# Patient Record
Sex: MALE | Race: WHITE | NOT HISPANIC OR LATINO
[De-identification: names, ages, dates, MRNs, and addresses within clinical notes are randomized per-mention and may not be internally consistent; named-entity substitution may affect disease eponyms.]

---

## 2022-04-11 ENCOUNTER — APPOINTMENT (OUTPATIENT)
Dept: ORTHOPEDIC SURGERY | Facility: CLINIC | Age: 32
End: 2022-04-11
Payer: OTHER MISCELLANEOUS

## 2022-04-11 VITALS — BODY MASS INDEX: 26.41 KG/M2 | HEIGHT: 72 IN | WEIGHT: 195 LBS

## 2022-04-11 DIAGNOSIS — Z78.9 OTHER SPECIFIED HEALTH STATUS: ICD-10-CM

## 2022-04-11 DIAGNOSIS — M76.899 OTHER SPECIFIED ENTHESOPATHIES OF UNSPECIFIED LOWER LIMB, EXCLUDING FOOT: ICD-10-CM

## 2022-04-11 PROBLEM — Z00.00 ENCOUNTER FOR PREVENTIVE HEALTH EXAMINATION: Status: ACTIVE | Noted: 2022-04-11

## 2022-04-11 PROCEDURE — 99072 ADDL SUPL MATRL&STAF TM PHE: CPT

## 2022-04-11 PROCEDURE — 99455 WORK RELATED DISABILITY EXAM: CPT

## 2022-04-11 NOTE — WORK
[Has the patient reached Maximum Medical Improvement? If yes, indicate date___] : Yes, on [unfilled] [Is there permanent partial impairment?] : Yes [Right] : right [FreeTextEntry5] : 7.5% [de-identified] : motion loss [de-identified] : right knee

## 2022-04-11 NOTE — PHYSICAL EXAM
[Right] : right knee [Left] : left knee [5___] : hamstring 5[unfilled]/5 [] : patient ambulates without assistive device [[id number]] : flexion 125 degrees [de-identified] : extension 5 degrees

## 2022-06-09 ENCOUNTER — EMERGENCY (EMERGENCY)
Facility: HOSPITAL | Age: 32
LOS: 1 days | Discharge: HOME | End: 2022-06-09
Attending: EMERGENCY MEDICINE | Admitting: EMERGENCY MEDICINE
Payer: COMMERCIAL

## 2022-06-09 VITALS
SYSTOLIC BLOOD PRESSURE: 163 MMHG | RESPIRATION RATE: 14 BRPM | DIASTOLIC BLOOD PRESSURE: 84 MMHG | HEART RATE: 77 BPM | TEMPERATURE: 98 F | OXYGEN SATURATION: 96 %

## 2022-06-09 VITALS
HEART RATE: 76 BPM | OXYGEN SATURATION: 96 % | DIASTOLIC BLOOD PRESSURE: 87 MMHG | HEIGHT: 72 IN | SYSTOLIC BLOOD PRESSURE: 132 MMHG | RESPIRATION RATE: 14 BRPM | TEMPERATURE: 98 F | WEIGHT: 179.9 LBS

## 2022-06-09 LAB
ALBUMIN SERPL ELPH-MCNC: 4.5 G/DL — SIGNIFICANT CHANGE UP (ref 3.3–5)
ALP SERPL-CCNC: 43 U/L — SIGNIFICANT CHANGE UP (ref 30–120)
ALT FLD-CCNC: 23 U/L DA — SIGNIFICANT CHANGE UP (ref 10–60)
ANION GAP SERPL CALC-SCNC: 9 MMOL/L — SIGNIFICANT CHANGE UP (ref 5–17)
ANISOCYTOSIS BLD QL: SLIGHT — SIGNIFICANT CHANGE UP
AST SERPL-CCNC: 14 U/L — SIGNIFICANT CHANGE UP (ref 10–40)
BASOPHILS # BLD AUTO: 0 K/UL — SIGNIFICANT CHANGE UP (ref 0–0.2)
BASOPHILS NFR BLD AUTO: 0 % — SIGNIFICANT CHANGE UP (ref 0–2)
BILIRUB SERPL-MCNC: 0.8 MG/DL — SIGNIFICANT CHANGE UP (ref 0.2–1.2)
BUN SERPL-MCNC: 19 MG/DL — SIGNIFICANT CHANGE UP (ref 7–23)
CALCIUM SERPL-MCNC: 9.7 MG/DL — SIGNIFICANT CHANGE UP (ref 8.4–10.5)
CHLORIDE SERPL-SCNC: 100 MMOL/L — SIGNIFICANT CHANGE UP (ref 96–108)
CO2 SERPL-SCNC: 28 MMOL/L — SIGNIFICANT CHANGE UP (ref 22–31)
CREAT SERPL-MCNC: 1.06 MG/DL — SIGNIFICANT CHANGE UP (ref 0.5–1.3)
EGFR: 96 ML/MIN/1.73M2 — SIGNIFICANT CHANGE UP
EOSINOPHIL # BLD AUTO: 0 K/UL — SIGNIFICANT CHANGE UP (ref 0–0.5)
EOSINOPHIL NFR BLD AUTO: 0 % — SIGNIFICANT CHANGE UP (ref 0–6)
GLUCOSE SERPL-MCNC: 103 MG/DL — HIGH (ref 70–99)
HCT VFR BLD CALC: 36.6 % — LOW (ref 39–50)
HGB BLD-MCNC: 11.7 G/DL — LOW (ref 13–17)
HYPOCHROMIA BLD QL: SIGNIFICANT CHANGE UP
LIDOCAIN IGE QN: 98 U/L — SIGNIFICANT CHANGE UP (ref 73–393)
LYMPHOCYTES # BLD AUTO: 0.82 K/UL — LOW (ref 1–3.3)
LYMPHOCYTES # BLD AUTO: 16 % — SIGNIFICANT CHANGE UP (ref 13–44)
MANUAL SMEAR VERIFICATION: SIGNIFICANT CHANGE UP
MCHC RBC-ENTMCNC: 20.6 PG — LOW (ref 27–34)
MCHC RBC-ENTMCNC: 32 GM/DL — SIGNIFICANT CHANGE UP (ref 32–36)
MCV RBC AUTO: 64.6 FL — LOW (ref 80–100)
MICROCYTES BLD QL: SIGNIFICANT CHANGE UP
MONOCYTES # BLD AUTO: 0.1 K/UL — SIGNIFICANT CHANGE UP (ref 0–0.9)
MONOCYTES NFR BLD AUTO: 2 % — SIGNIFICANT CHANGE UP (ref 2–14)
NEUTROPHILS # BLD AUTO: 4.2 K/UL — SIGNIFICANT CHANGE UP (ref 1.8–7.4)
NEUTROPHILS NFR BLD AUTO: 82 % — HIGH (ref 43–77)
NRBC # BLD: 0 — SIGNIFICANT CHANGE UP
NRBC # BLD: SIGNIFICANT CHANGE UP /100 WBCS (ref 0–0)
PLAT MORPH BLD: NORMAL — SIGNIFICANT CHANGE UP
PLATELET # BLD AUTO: 148 K/UL — LOW (ref 150–400)
PLATELET COUNT - ESTIMATE: NORMAL — SIGNIFICANT CHANGE UP
POIKILOCYTOSIS BLD QL AUTO: SLIGHT — SIGNIFICANT CHANGE UP
POLYCHROMASIA BLD QL SMEAR: SLIGHT — SIGNIFICANT CHANGE UP
POTASSIUM SERPL-MCNC: 3.9 MMOL/L — SIGNIFICANT CHANGE UP (ref 3.5–5.3)
POTASSIUM SERPL-SCNC: 3.9 MMOL/L — SIGNIFICANT CHANGE UP (ref 3.5–5.3)
PROT SERPL-MCNC: 6.8 G/DL — SIGNIFICANT CHANGE UP (ref 6–8.3)
RBC # BLD: 5.67 M/UL — SIGNIFICANT CHANGE UP (ref 4.2–5.8)
RBC # FLD: 17 % — HIGH (ref 10.3–14.5)
RBC BLD AUTO: ABNORMAL
SARS-COV-2 RNA SPEC QL NAA+PROBE: SIGNIFICANT CHANGE UP
SCHISTOCYTES BLD QL AUTO: SLIGHT — SIGNIFICANT CHANGE UP
SODIUM SERPL-SCNC: 137 MMOL/L — SIGNIFICANT CHANGE UP (ref 135–145)
WBC # BLD: 5.12 K/UL — SIGNIFICANT CHANGE UP (ref 3.8–10.5)
WBC # FLD AUTO: 5.12 K/UL — SIGNIFICANT CHANGE UP (ref 3.8–10.5)

## 2022-06-09 PROCEDURE — 87635 SARS-COV-2 COVID-19 AMP PRB: CPT

## 2022-06-09 PROCEDURE — 70450 CT HEAD/BRAIN W/O DYE: CPT | Mod: MA

## 2022-06-09 PROCEDURE — 80053 COMPREHEN METABOLIC PANEL: CPT

## 2022-06-09 PROCEDURE — 96360 HYDRATION IV INFUSION INIT: CPT

## 2022-06-09 PROCEDURE — 70450 CT HEAD/BRAIN W/O DYE: CPT | Mod: 26,MA

## 2022-06-09 PROCEDURE — 36415 COLL VENOUS BLD VENIPUNCTURE: CPT

## 2022-06-09 PROCEDURE — 85025 COMPLETE CBC W/AUTO DIFF WBC: CPT

## 2022-06-09 PROCEDURE — 99284 EMERGENCY DEPT VISIT MOD MDM: CPT | Mod: 25

## 2022-06-09 PROCEDURE — 83690 ASSAY OF LIPASE: CPT

## 2022-06-09 PROCEDURE — 99285 EMERGENCY DEPT VISIT HI MDM: CPT

## 2022-06-09 RX ORDER — SODIUM CHLORIDE 9 MG/ML
1000 INJECTION INTRAMUSCULAR; INTRAVENOUS; SUBCUTANEOUS ONCE
Refills: 0 | Status: COMPLETED | OUTPATIENT
Start: 2022-06-09 | End: 2022-06-09

## 2022-06-09 RX ADMIN — SODIUM CHLORIDE 1000 MILLILITER(S): 9 INJECTION INTRAMUSCULAR; INTRAVENOUS; SUBCUTANEOUS at 09:02

## 2022-06-09 RX ADMIN — SODIUM CHLORIDE 1000 MILLILITER(S): 9 INJECTION INTRAMUSCULAR; INTRAVENOUS; SUBCUTANEOUS at 10:00

## 2022-06-09 NOTE — ED ADULT NURSE NOTE - NS SC CAGE ALCOHOL EYE OPENER
RN and Assist. Director Nursing requesting 1:1 observation be d/c'ed as pt currently calm, cooperative. Discussed w/ Dr. Figueroa, will d/c 1:1. no

## 2022-06-09 NOTE — ED PROVIDER NOTE - EYES, MLM
Clear bilaterally, pupils equal, round and reactive to light. pink conjunctiva. no scleral icterus. no photophobia

## 2022-06-09 NOTE — ED PROVIDER NOTE - CARE PROVIDER_API CALL
Gil Jane  INTERNAL MEDICINE  [address] [place] [address] [address]  [place] [address]  Phone: [phone]  Fax: [phone]  Follow Up Time: 1-3 Days

## 2022-06-09 NOTE — ED PROVIDER NOTE - PROGRESS NOTE DETAILS
Reevaluated patient at bedside.  Patient feeling much improved, no ha, no nausea. pt declining LP.  Discussed the results of all diagnostic testing in ED and copies of all reports given.   An opportunity to ask questions was given.  Discussed the importance of prompt, close medical follow-up.  Patient will return with any changes, concerns or persistent / worsening symptoms.  Understanding of all instructions verbalized.

## 2022-06-09 NOTE — ED PROVIDER NOTE - CLINICAL SUMMARY MEDICAL DECISION MAKING FREE TEXT BOX
pt bib ems for n/v at work this am. pt relates as he got to work, he developed mild ha, nausea, then vomited. pt then began to hyperventilate, felt tingling in his extermities, ems called, pt given zofran 4mg by ems, nausea completely resolved, headache greatly improved.  plan - labs/ct/ivf

## 2022-06-09 NOTE — ED ADULT NURSE NOTE - NSIMPLEMENTINTERV_GEN_ALL_ED
Implemented All Universal Safety Interventions:  [place] to call system. Call bell, personal items and telephone within reach. Instruct patient to call for assistance. Room bathroom lighting operational. Non-slip footwear when patient is off stretcher. Physically safe environment: no spills, clutter or unnecessary equipment. Stretcher in lowest position, wheels locked, appropriate side rails in place.

## 2022-06-09 NOTE — ED PROVIDER NOTE - PATIENT PORTAL LINK FT
You can access the FollowMyHealth Patient Portal offered by [facility] by registering at the following website: [email or web address]. By joining [employer]’s FollowMyHealth portal, you will also be able to view your health information using other applications (apps) compatible with our system.

## 2022-06-09 NOTE — ED PROVIDER NOTE - OBJECTIVE STATEMENT
pt bib ems for n/v at work this am. pt relates as he got to work, he developed mild ha, nausea, then vomited. pt then began to hyperventilate, felt tingling in his extremities, ems called, pt given zofran 4mg by ems, nausea completely resolved, headache greatly improved. no fevers, chills, cp, sob, cough, abd pain, diarrhea, urinary complaints.  [facility]

## 2022-06-09 NOTE — ED ADULT NURSE NOTE - OBJECTIVE STATEMENT
today went to work and had dry heaves and developed numbness and tingling to hands feet and lips but resolved prior to arrival. still with mild nausea. c/o 35 pound weight loss in 2 years but last year has lost 10 of those pounds. denies cough or sore throat.  pt aaox3 skin warm and dry no resp distress lungs clear and equal b/l ascultation abd soft non tender + bs  pt c/o photophobia but denies fever or chills no worsening s/s  with touching chin to chest

## 2022-12-16 PROBLEM — Z78.9 OTHER SPECIFIED HEALTH STATUS: Chronic | Status: ACTIVE | Noted: 2022-06-09

## 2022-12-18 ENCOUNTER — FORM ENCOUNTER (OUTPATIENT)
Age: 32
End: 2022-12-18

## 2022-12-19 ENCOUNTER — APPOINTMENT (OUTPATIENT)
Dept: ORTHOPEDIC SURGERY | Facility: CLINIC | Age: 32
End: 2022-12-19

## 2022-12-19 ENCOUNTER — NON-APPOINTMENT (OUTPATIENT)
Age: 32
End: 2022-12-19

## 2022-12-19 ENCOUNTER — APPOINTMENT (OUTPATIENT)
Dept: MRI IMAGING | Facility: CLINIC | Age: 32
End: 2022-12-19
Payer: OTHER MISCELLANEOUS

## 2022-12-19 VITALS — HEIGHT: 72 IN | BODY MASS INDEX: 26.41 KG/M2 | WEIGHT: 195 LBS

## 2022-12-19 DIAGNOSIS — S43.422A SPRAIN OF LEFT ROTATOR CUFF CAPSULE, INITIAL ENCOUNTER: ICD-10-CM

## 2022-12-19 PROCEDURE — 99072 ADDL SUPL MATRL&STAF TM PHE: CPT

## 2022-12-19 PROCEDURE — 73010 X-RAY EXAM OF SHOULDER BLADE: CPT | Mod: LT

## 2022-12-19 PROCEDURE — 99214 OFFICE O/P EST MOD 30 MIN: CPT

## 2022-12-19 PROCEDURE — 73221 MRI JOINT UPR EXTREM W/O DYE: CPT | Mod: LT

## 2022-12-19 PROCEDURE — 73030 X-RAY EXAM OF SHOULDER: CPT | Mod: LT

## 2022-12-19 NOTE — ASSESSMENT
[FreeTextEntry1] : TRAUMATIC LEFT SHOULDER INJURY AFTER LIFTING INJURY AT WORK\par CONCERN FOR DISLOCATION/INSTABILITY EPISODE\par GUARDED ON EXAM TODAY\par NORMAL XRAYS\par ADVISED MRI EVAL TEAR\par NO WORK

## 2022-12-19 NOTE — IMAGING
[de-identified] : No swelling, no ecchymosis, no ayaz deformity\par Tenderness to palpation over anterior shoulder\par No instability or tenderness over AC joint\par Too guarded to fully assess ROM\par 5/5 supraspinatus, infraspinatus and subscapularis; there is pain with strength testing\par Positive O’Steve\par No anterior or posterior shift, no sulcus sign\par + apprehension\par Motor and sensory intact distally\par  [Left] : left shoulder [There are no fractures, subluxations or dislocations. No significant abnormalities are seen] : There are no fractures, subluxations or dislocations. No significant abnormalities are seen

## 2022-12-19 NOTE — HISTORY OF PRESENT ILLNESS
[Work related] : work related [7] : 7 [6] : 6 [Localized] : localized [Sharp] : sharp [Tightness] : tightness [Constant] : constant [Household chores] : household chores [Rest] : rest [Lying in bed] : lying in bed [Not working due to injury] : Work status: not working due to injury [de-identified] : 12/19/22 pt presents here today with left shoulder after pushing a box  of  40-50 pounds weight to the ground and hear a pop on the shoulder , pt is a  \par currently out of work  [Sudden] : sudden [Sleep] : sleep [] : yes [FreeTextEntry1] : left shoulder  [FreeTextEntry3] : 12/06/22 [FreeTextEntry5] : WORKS AS A , 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 FINISHED DAY BUT THE FOLLOWING WEEK FELT POP AGAIN AND ARM FELT "NUMB", UNABLE TO WORK AFTERWARDS; DENIES PRIOR SHOULDER ISSUES. CURRENTLY OOW [de-identified] : with activity [de-identified] : x rays done at [facility] [de-identified] : nothing [de-identified] :

## 2022-12-23 ENCOUNTER — APPOINTMENT (OUTPATIENT)
Dept: ORTHOPEDIC SURGERY | Facility: CLINIC | Age: 32
End: 2022-12-23

## 2022-12-23 VITALS — BODY MASS INDEX: 26.41 KG/M2 | HEIGHT: 72 IN | WEIGHT: 195 LBS

## 2022-12-23 PROCEDURE — 99072 ADDL SUPL MATRL&STAF TM PHE: CPT

## 2022-12-23 PROCEDURE — 99214 OFFICE O/P EST MOD 30 MIN: CPT

## 2022-12-23 NOTE — HISTORY OF PRESENT ILLNESS
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 FINISHED DAY BUT THE FOLLOWING WEEK FELT POP AGAIN AND ARM FELT "NUMB", UNABLE TO WORK AFTERWARDS; DENIES PRIOR SHOULDER ISSUES. CURRENTLY OOW\par 12/23/22: MRI REVIEW [FreeTextEntry1] : left shoulder  [FreeTextEntry3] : 12/06/22 [de-identified] : with activity [de-identified] : nothing

## 2022-12-23 NOTE — ASSESSMENT
[FreeTextEntry1] : REVIEWED MRI IN DETAIL\par POSTERIOR LABRAL TEAR AFTER WORK RELATED INJURY\par COURSE OF PT AND CAREFUL MONITORING ADVISED\par IF NOT IMPROVED IN 6 WEEKS WE DISCUSSED SURGICAL REPAIR\par SEDENTARY WORK ONLY IF AVAILABLE

## 2022-12-23 NOTE — IMAGING
[de-identified] : No swelling, no ecchymosis, no ayaz deformity\par Tenderness to palpation over anterior shoulder\par No instability or tenderness over AC joint\par Too guarded to fully assess ROM\par 5/5 supraspinatus, infraspinatus and subscapularis; there is pain with strength testing\par Positive O’Steve\par No anterior or posterior shift, no sulcus sign\par + apprehension\par Motor and sensory intact distally\par

## 2023-02-03 ENCOUNTER — APPOINTMENT (OUTPATIENT)
Dept: ORTHOPEDIC SURGERY | Facility: CLINIC | Age: 33
End: 2023-02-03
Payer: OTHER MISCELLANEOUS

## 2023-02-03 ENCOUNTER — NON-APPOINTMENT (OUTPATIENT)
Age: 33
End: 2023-02-03

## 2023-02-03 VITALS — HEIGHT: 72 IN | BODY MASS INDEX: 26.41 KG/M2 | WEIGHT: 195 LBS

## 2023-02-03 PROCEDURE — 99072 ADDL SUPL MATRL&STAF TM PHE: CPT

## 2023-02-03 PROCEDURE — J3490M: CUSTOM | Mod: LT

## 2023-02-03 PROCEDURE — 20611 DRAIN/INJ JOINT/BURSA W/US: CPT

## 2023-02-03 PROCEDURE — 99214 OFFICE O/P EST MOD 30 MIN: CPT | Mod: 25

## 2023-02-03 NOTE — REVIEW OF SYSTEMS
"Chief Complaint   Patient presents with     Pain     right leg/hip pain       Initial BP (!) 182/99 (BP Location: Left arm, Patient Position: Chair, Cuff Size: Adult Regular)  Pulse 68  Ht 5' 10.5\" (1.791 m)  Wt 209 lb (94.8 kg)  BMI 29.56 kg/m2 Estimated body mass index is 29.56 kg/(m^2) as calculated from the following:    Height as of this encounter: 5' 10.5\" (1.791 m).    Weight as of this encounter: 209 lb (94.8 kg).  Medication Reconciliation: complete  " [Joint Pain] : joint pain [Negative] : Heme/Lymph

## 2023-02-06 NOTE — IMAGING
[de-identified] : No swelling, no ecchymosis, no ayaz deformity\par Tenderness to palpation over anterior shoulder\par No instability or tenderness over AC joint\par Too guarded to fully assess ROM\par 5/5 supraspinatus, infraspinatus and subscapularis; there is pain with strength testing\par Positive O’Steve\par No anterior or posterior shift, no sulcus sign\par + apprehension\par Motor and sensory intact distally\par

## 2023-02-06 NOTE — PROCEDURE
[FreeTextEntry3] : -Large joint injection was performed of the left shoulder glenohumeral joint\par -The indication for this procedure was pain, inflammation and x-ray evidence of Osteoarthritis on this or prior visit. The site was prepped with alcohol, betadine, ethyl chloride sprayed topically and sterile technique used. \par -An injection of Betamethasone 2cc, , Marcaine 5cc was used.  \par -Patient was advised to call if redness, pain or fever occur, apply ice for 15 minutes out of every hour for the next 12-24 hours as tolerated and patient was advised to rest the joint(s) for 2 days. Patient has tried OTC's including aspirin, Ibuprofen, Aleve, etc or prescription NSAIDS, and/or exercises at home and/or physical therapy without satisfactory response, patient had decreased mobility in the joint and the risks benefits, and alternatives have been discussed, and verbal consent was obtained. \par -Ultrasound guidance was indicated for this patient for glenohumeral injection. All ultrasound images have been permanently captured and stored accordingly in our picture archiving and communication system. Visualization of the needle and placement of injection was performed without complication.\par

## 2023-02-06 NOTE — HISTORY OF PRESENT ILLNESS
[Work related] : work related [Sudden] : sudden [7] : 7 [5] : 5 [Sharp] : sharp [Rest] : rest [Not working due to injury] : Work status: not working due to injury [de-identified] : 12/19/22: WORKS AS A , 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 FINISHED DAY BUT THE FOLLOWING WEEK FELT POP AGAIN AND ARM FELT "NUMB", UNABLE TO WORK AFTERWARDS; DENIES PRIOR SHOULDER ISSUES. CURRENTLY OOW\par 12/23/22: MRI REVIEW\par 02/03/2023: FOLLOW UP FOR THE EFT SHOULDER. STATES NO SIGNIFICANT IMPROVEMENT IN PAIN. CURRENTLY GOING TO PT. OUT OF WORK DUE TO INJURY.  [] : Post Surgical Visit: no [FreeTextEntry1] : left shoulder  [FreeTextEntry3] : 12/06/22 [de-identified] : with activity [de-identified] : nothing [de-identified] :

## 2023-02-06 NOTE — ASSESSMENT
[FreeTextEntry1] : Stable exam and no improvement in pain with physical therapy\par Recommend a glenohumeral corticosteroid injection today in order to alleviate the symptoms\par Continue physical therapy\par No work\par Follow-up in 6 weeks if still no improvement would need to set up arthroscopic repair

## 2023-02-24 ENCOUNTER — APPOINTMENT (OUTPATIENT)
Dept: ORTHOPEDIC SURGERY | Facility: CLINIC | Age: 33
End: 2023-02-24
Payer: OTHER MISCELLANEOUS

## 2023-02-24 PROCEDURE — 99072 ADDL SUPL MATRL&STAF TM PHE: CPT

## 2023-02-24 PROCEDURE — 99213 OFFICE O/P EST LOW 20 MIN: CPT

## 2023-02-24 NOTE — IMAGING
[de-identified] : No swelling, no ecchymosis, no ayaz deformity\par Tenderness to palpation over anterior shoulder\par No instability or tenderness over AC joint\par Full ROM with pain.\par 5/5 supraspinatus, infraspinatus and subscapularis; there is pain with strength testing\par Positive O’Steve\par No anterior or posterior shift, no sulcus sign\par + apprehension\par Motor and sensory intact distally\par

## 2023-02-24 NOTE — HISTORY OF PRESENT ILLNESS
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 FINISHED DAY BUT THE FOLLOWING WEEK FELT POP AGAIN AND ARM FELT "NUMB", UNABLE TO WORK AFTERWARDS; DENIES PRIOR SHOULDER ISSUES. CURRENTLY OOW\par 12/23/22: MRI REVIEW\par 02/03/2023: FOLLOW UP FOR THE EFT SHOULDER. STATES NO SIGNIFICANT IMPROVEMENT IN PAIN. CURRENTLY GOING TO PT. OUT OF WORK DUE TO INJURY. \par \par 02/24/23 HERE FOR A FOLLOW UP WORKERS COMP ON THE LEFT SHOULDER , NO HELP WITH CORTISONE OR PHYSICAL THERAPY OUT OF WORK  [] : Post Surgical Visit: no [FreeTextEntry1] : left shoulder  [FreeTextEntry3] : 12/06/22 [de-identified] : with activity [de-identified] : CSI INJECTION [de-identified] :

## 2023-02-24 NOTE — ASSESSMENT
[FreeTextEntry1] : NO RELIEF FROM [facility] CSI LAST VISIT, CONTINUES TO HAVE PAIN\par NO RELIEF FROM PT\par DUE TO HIGH RATE OF FALSE POSITIVES WITH LABRAL TEARS ON MRI, RECOMMEND MRI ARTHROGRAM TO FURTHER ASSESS NECESSITY FOR SURGICAL INTERVENTION\par RTO P MRI TO REVIEW\par NOT WORKING

## 2023-03-15 ENCOUNTER — RESULT REVIEW (OUTPATIENT)
Age: 33
End: 2023-03-15

## 2023-05-23 ENCOUNTER — APPOINTMENT (OUTPATIENT)
Dept: ORTHOPEDIC SURGERY | Facility: CLINIC | Age: 33
End: 2023-05-23
Payer: OTHER MISCELLANEOUS

## 2023-05-23 PROCEDURE — L3670: CPT | Mod: LT

## 2023-06-06 ENCOUNTER — APPOINTMENT (OUTPATIENT)
Age: 33
End: 2023-06-06
Payer: OTHER MISCELLANEOUS

## 2023-06-06 PROCEDURE — 29807 SHO ARTHRS SRG RPR SLAP LES: CPT | Mod: AS,LT

## 2023-06-06 PROCEDURE — 29806 SHO ARTHRS SRG CAPSULORRAPHY: CPT | Mod: AS,59,LT

## 2023-06-06 PROCEDURE — 29807 SHO ARTHRS SRG RPR SLAP LES: CPT | Mod: LT

## 2023-06-06 PROCEDURE — 29806 SHO ARTHRS SRG CAPSULORRAPHY: CPT | Mod: 59,LT

## 2023-06-06 RX ORDER — OXYCODONE AND ACETAMINOPHEN 5; 325 MG/1; MG/1
5-325 TABLET ORAL
Qty: 30 | Refills: 0 | Status: ACTIVE | COMMUNITY
Start: 2023-06-06 | End: 1900-01-01

## 2023-06-15 ENCOUNTER — APPOINTMENT (OUTPATIENT)
Dept: ORTHOPEDIC SURGERY | Facility: CLINIC | Age: 33
End: 2023-06-15
Payer: OTHER MISCELLANEOUS

## 2023-06-15 VITALS — BODY MASS INDEX: 27.09 KG/M2 | WEIGHT: 200 LBS | HEIGHT: 72 IN

## 2023-06-15 DIAGNOSIS — Z78.9 OTHER SPECIFIED HEALTH STATUS: ICD-10-CM

## 2023-06-15 PROCEDURE — 99024 POSTOP FOLLOW-UP VISIT: CPT

## 2023-06-15 NOTE — ASSESSMENT
[FreeTextEntry1] : DOING WELL DURING THIS FIRST POSTOP VISIT\par SUTURES REMOVED\par PRECAUTIONS AND RESTRICTIONS REVIEWED IN DETAIL\par WE REVIEWED THE INTRAOPERATIVE FINDINGS IN DETAIL (INCLUDING SCOPE PICTURES WHERE APPLICABLE)\par ALL QUESTIONS ANSWERED\par START PT\par CONTINUE SLING\par NO WORK\par

## 2023-06-15 NOTE — HISTORY OF PRESENT ILLNESS
[Work related] : work related [Sudden] : sudden [4] : 4 [3] : 3 [Dull/Aching] : dull/aching [Household chores] : household chores [Not working due to injury] : Work status: not working due to injury [de-identified] : 12/19/22: WORKS AS A , 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 FINISHED DAY BUT THE FOLLOWING WEEK FELT POP AGAIN AND ARM FELT "NUMB", UNABLE TO WORK AFTERWARDS; DENIES PRIOR SHOULDER ISSUES. CURRENTLY OOW\par 12/23/22: MRI REVIEW\par 02/03/2023: FOLLOW UP FOR THE EFT SHOULDER. STATES NO SIGNIFICANT IMPROVEMENT IN PAIN. CURRENTLY GOING TO PT. OUT OF WORK DUE TO INJURY. \par \par 02/24/23 HERE FOR A FOLLOW UP WORKERS COMP ON THE LEFT SHOULDER , NO HELP WITH CORTISONE OR PHYSICAL THERAPY OUT OF WORK  [] : Post Surgical Visit: no [FreeTextEntry1] : left shoulder  [FreeTextEntry3] : 12/06/22 [FreeTextEntry5] : Pt is a 32 y/o RHD M who presents 1 wk s/p L shldr scope w/ labral repair on 6/6/23 due to a work injury on WC DOI above . Pt states recovery is painful but is progressing slowly  [de-identified] : Local 91

## 2023-06-15 NOTE — IMAGING
[de-identified] : No erythema or warmth\par Clean and dry incisions, sutures in place\par Shoulder immobilizer in place\par Limited ROM compatible with recent surgery\par Motor and sensory intact distally\par \par

## 2023-06-15 NOTE — WORK
[Total (100%)] : total (100%) [Cannot return to work because ________] : cannot return to work because [unfilled] [No Rx restrictions] : No Rx restrictions. [I provided the services listed above] :  I provided the services listed above.

## 2023-06-22 ENCOUNTER — FORM ENCOUNTER (OUTPATIENT)
Age: 33
End: 2023-06-22

## 2023-07-06 ENCOUNTER — FORM ENCOUNTER (OUTPATIENT)
Age: 33
End: 2023-07-06

## 2023-08-17 ENCOUNTER — APPOINTMENT (OUTPATIENT)
Dept: ORTHOPEDIC SURGERY | Facility: CLINIC | Age: 33
End: 2023-08-17
Payer: OTHER MISCELLANEOUS

## 2023-08-17 VITALS — BODY MASS INDEX: 27.63 KG/M2 | HEIGHT: 72 IN | WEIGHT: 204 LBS

## 2023-08-17 PROCEDURE — 99024 POSTOP FOLLOW-UP VISIT: CPT

## 2023-08-17 NOTE — IMAGING
[de-identified] : No erythema or warmth Portals healed Shoulder immobilizer in place 160/70/40/30 Motor and sensory intact distally

## 2023-08-17 NOTE — HISTORY OF PRESENT ILLNESS
[Work related] : work related [Sudden] : sudden [4] : 4 [3] : 3 [Dull/Aching] : dull/aching [Household chores] : household chores [Not working due to injury] : Work status: not working due to injury [de-identified] : 12/19/22: WORKS AS A , 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 FINISHED DAY BUT THE FOLLOWING WEEK FELT POP AGAIN AND ARM FELT "NUMB", UNABLE TO WORK AFTERWARDS; DENIES PRIOR SHOULDER ISSUES. CURRENTLY OOW\par  12/23/22: MRI REVIEW\par  02/03/2023: FOLLOW UP FOR THE EFT SHOULDER. STATES NO SIGNIFICANT IMPROVEMENT IN PAIN. CURRENTLY GOING TO PT. OUT OF WORK DUE TO INJURY. \par  \par  02/24/23 HERE FOR A FOLLOW UP WORKERS COMP ON THE LEFT SHOULDER , NO HELP WITH CORTISONE OR PHYSICAL THERAPY OUT OF WORK  [] : This patient has had an injection before: no [FreeTextEntry1] : left shoulder  [FreeTextEntry3] : 12/06/22 [FreeTextEntry5] : Pt is a 34 y/o RHD M who presents 1 wk s/p L shldr scope w/ labral repair on 6/6/23 due to a work injury on WC DOI above . Pt states recovery is painful but is progressing slowly  [de-identified] : Local 91    [de-identified] : 6/6/23 [de-identified] : L shldr arthroscopy w/ ;abral repair

## 2023-08-17 NOTE — ASSESSMENT
[FreeTextEntry1] : Progressing well.  Needs to continue to work on and range of motion.  May begin strengthening in a couple of weeks.  Okay to start running and doing cardio in the next week or 2 as well.  He is currently out of work.

## 2023-09-28 ENCOUNTER — APPOINTMENT (OUTPATIENT)
Dept: ORTHOPEDIC SURGERY | Facility: CLINIC | Age: 33
End: 2023-09-28
Payer: OTHER MISCELLANEOUS

## 2023-09-28 VITALS — WEIGHT: 204 LBS | BODY MASS INDEX: 27.63 KG/M2 | HEIGHT: 72 IN

## 2023-09-28 PROCEDURE — 99214 OFFICE O/P EST MOD 30 MIN: CPT

## 2023-11-01 ENCOUNTER — NON-APPOINTMENT (OUTPATIENT)
Age: 33
End: 2023-11-01

## 2023-11-02 ENCOUNTER — APPOINTMENT (OUTPATIENT)
Dept: ORTHOPEDIC SURGERY | Facility: CLINIC | Age: 33
End: 2023-11-02
Payer: OTHER MISCELLANEOUS

## 2023-11-02 VITALS — WEIGHT: 204 LBS | HEIGHT: 72 IN | BODY MASS INDEX: 27.63 KG/M2

## 2023-11-02 PROCEDURE — 99214 OFFICE O/P EST MOD 30 MIN: CPT

## 2023-12-13 ENCOUNTER — NON-APPOINTMENT (OUTPATIENT)
Age: 33
End: 2023-12-13

## 2023-12-14 ENCOUNTER — APPOINTMENT (OUTPATIENT)
Dept: ORTHOPEDIC SURGERY | Facility: CLINIC | Age: 33
End: 2023-12-14
Payer: OTHER MISCELLANEOUS

## 2023-12-14 VITALS — WEIGHT: 204 LBS | HEIGHT: 72 IN | BODY MASS INDEX: 27.63 KG/M2

## 2023-12-14 PROCEDURE — 99214 OFFICE O/P EST MOD 30 MIN: CPT

## 2023-12-14 NOTE — ASSESSMENT
[FreeTextEntry1] : Doing much better with the physical therapy since the last visit.  I do believe he is ready to return to work on 12/26/2023.  Will continue physical therapy in the interim.  Follow-up in 3 months to reassess.

## 2023-12-14 NOTE — HISTORY OF PRESENT ILLNESS
[Work related] : work related [Sudden] : sudden [3] : 3 [Dull/Aching] : dull/aching [Household chores] : household chores [Not working due to injury] : Work status: not working due to injury [de-identified] : 12/19/22: WORKS AS A , 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 FINISHED DAY BUT THE FOLLOWING WEEK FELT POP AGAIN AND ARM FELT "NUMB", UNABLE TO WORK AFTERWARDS; DENIES PRIOR SHOULDER ISSUES. CURRENTLY OOW 12/23/22: MRI REVIEW 02/03/2023: FOLLOW UP FOR THE EFT SHOULDER. STATES NO SIGNIFICANT IMPROVEMENT IN PAIN. CURRENTLY GOING TO PT. OUT OF WORK DUE TO INJURY.   02/24/23 HERE FOR A FOLLOW UP WORKERS COMP ON THE LEFT SHOULDER , NO HELP WITH CORTISONE OR PHYSICAL THERAPY OUT OF WORK  11/2/2023: Here for WC Follow up on the left shoulder. Has been to 3 sessions of PT so far. Shoulder pain and stiffness has improved since last visit.  12/14/23: here to follow up on left shoulder. Had labral repair on 6/6/23. Currently doing PT. Notes improvement.  [] : This patient has had an injection before: no [FreeTextEntry1] : left shoulder  [FreeTextEntry3] : 12/06/22 [de-identified] : PT  [de-identified] : Local 91    [de-identified] : 6/6/23 [de-identified] : L shldr arthroscopy w/ ;abral repair

## 2023-12-14 NOTE — WORK
[Mild Partial] : mild partial [Does not reveal pre-existing condition(s) that may affect treatment/prognosis] : does not reveal pre-existing condition(s) that may affect treatment/prognosis [Can return to work without limitations on ______] : can return to work without limitations on [unfilled] [No Rx restrictions] : No Rx restrictions. [I provided the services listed above] :  I provided the services listed above.

## 2024-03-14 ENCOUNTER — APPOINTMENT (OUTPATIENT)
Dept: ORTHOPEDIC SURGERY | Facility: CLINIC | Age: 34
End: 2024-03-14

## 2024-03-29 ENCOUNTER — APPOINTMENT (OUTPATIENT)
Dept: ORTHOPEDIC SURGERY | Facility: CLINIC | Age: 34
End: 2024-03-29
Payer: OTHER MISCELLANEOUS

## 2024-03-29 PROCEDURE — 99213 OFFICE O/P EST LOW 20 MIN: CPT | Mod: ACP

## 2024-03-29 NOTE — WORK
[Mild Partial] : mild partial [Can return to work without limitations on ______] : can return to work without limitations on [unfilled] [Does not reveal pre-existing condition(s) that may affect treatment/prognosis] : does not reveal pre-existing condition(s) that may affect treatment/prognosis [I provided the services listed above] :  I provided the services listed above. [No Rx restrictions] : No Rx restrictions.

## 2024-03-29 NOTE — HISTORY OF PRESENT ILLNESS
[Work related] : work related [Sudden] : sudden [3] : 3 [Dull/Aching] : dull/aching [Household chores] : household chores [Not working due to injury] : Work status: not working due to injury [de-identified] : 12/19/22: WORKS AS A , 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 FINISHED DAY BUT THE FOLLOWING WEEK FELT POP AGAIN AND ARM FELT "NUMB", UNABLE TO WORK AFTERWARDS; DENIES PRIOR SHOULDER ISSUES. CURRENTLY OOW 12/23/22: MRI REVIEW 02/03/2023: FOLLOW UP FOR THE EFT SHOULDER. STATES NO SIGNIFICANT IMPROVEMENT IN PAIN. CURRENTLY GOING TO PT. OUT OF WORK DUE TO INJURY.   02/24/23 HERE FOR A FOLLOW UP WORKERS COMP ON THE LEFT SHOULDER , NO HELP WITH CORTISONE OR PHYSICAL THERAPY OUT OF WORK  11/2/2023: Here for WC Follow up on the left shoulder. Has been to 3 sessions of PT so far. Shoulder pain and stiffness has improved since last visit.  12/14/23: here to follow up on left shoulder. Had labral repair on 6/6/23. Currently doing PT. Notes improvement.   03/29/2024: Left shoulder labral repair from 6/6/23. He continues to work full duty and doing hep. Tolerating full activities notes tightness in bicep distribution with increased demands.  [] : This patient has had an injection before: no [FreeTextEntry1] : left shoulder  [FreeTextEntry3] : 12/06/22 [de-identified] : PT  [de-identified] : Local 91    [de-identified] : 6/6/23 [de-identified] : L shldr arthroscopy w/ ;abral repair

## 2024-03-29 NOTE — ASSESSMENT
[FreeTextEntry1] : continue with hep and activities as tolerated. prn ibuprofen 800mg and stretching f/u 3 months

## 2024-06-06 ENCOUNTER — APPOINTMENT (OUTPATIENT)
Dept: ORTHOPEDIC SURGERY | Facility: CLINIC | Age: 34
End: 2024-06-06
Payer: OTHER MISCELLANEOUS

## 2024-06-06 DIAGNOSIS — M75.22 BICIPITAL TENDINITIS, LEFT SHOULDER: ICD-10-CM

## 2024-06-06 DIAGNOSIS — S43.432D SUPERIOR GLENOID LABRUM LESION OF LEFT SHOULDER, SUBSEQUENT ENCOUNTER: ICD-10-CM

## 2024-06-06 PROCEDURE — 99455 WORK RELATED DISABILITY EXAM: CPT

## 2024-06-06 NOTE — WORK
[Has the patient reached Maximum Medical Improvement? If yes, indicate date___] : Yes, on [unfilled] [Is there permanent partial impairment?] : Yes [FreeTextEntry6] : Left shoulder 17.5% (Abduction deficit 10%; extension deficit 7.5%) Measurements performed with patient sitting. Average of 3 active ROM measurements using goniometer. [Has the patient had an injury/illness since the date of injury which impacts residual functional capacity?] : No [Would the patient benefit from vocational rehabilitation? If Yes, explain below.] :  No

## 2024-06-06 NOTE — HISTORY OF PRESENT ILLNESS
[Work related] : work related [Sudden] : sudden [3] : 3 [Dull/Aching] : dull/aching [Household chores] : household chores [Not working due to injury] : Work status: not working due to injury [de-identified] : 12/19/22: WORKS AS A , 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 FINISHED DAY BUT THE FOLLOWING WEEK FELT POP AGAIN AND ARM FELT "NUMB", UNABLE TO WORK AFTERWARDS; DENIES PRIOR SHOULDER ISSUES. CURRENTLY OOW 12/23/22: MRI REVIEW 02/03/2023: FOLLOW UP FOR THE EFT SHOULDER. STATES NO SIGNIFICANT IMPROVEMENT IN PAIN. CURRENTLY GOING TO PT. OUT OF WORK DUE TO INJURY.   02/24/23 HERE FOR A FOLLOW UP WORKERS COMP ON THE LEFT SHOULDER , NO HELP WITH CORTISONE OR PHYSICAL THERAPY OUT OF WORK  11/2/2023: Here for WC Follow up on the left shoulder. Has been to 3 sessions of PT so far. Shoulder pain and stiffness has improved since last visit.  12/14/23: here to follow up on left shoulder. Had labral repair on 6/6/23. Currently doing PT. Notes improvement. 6/6/24: here to follow up on left shoulder. Had labral repair on 6/6/23. Finished PT. Continued difficutly with ROM. Experiences stiffness/pain in biceps. Currently working; .  [] : This patient has had an injection before: no [FreeTextEntry1] : left shoulder  [FreeTextEntry3] : 12/06/22 [de-identified] : PT  [de-identified] : Local 91    [de-identified] : 6/6/23 [de-identified] : L shldr arthroscopy w/ ;abral repair

## 2024-06-06 NOTE — IMAGING
[de-identified] : Left Shoulder Exam: Inspection: No swelling, no ecchymosis, no deformity, no scapular winging. Palpation: No tenderness to palpation over shoulder, AC joint or trapezius. Range of motion:  Forward flexion: Active 170 degrees; Passive 180 degrees Abduction: Active 155 degrees; Passive 170 degrees External rotation (with shoulder abducted): Active 85 degrees , Passive 90 degrees Internal rotation (with shoulder abducted): Active 60 degrees, Passive 70 degrees Extension: Active 35 degrees; passive 50 degrees Adduction: Active 30 degrees; passive 30 degrees Strength: 5/5 supraspinatus, 5/5 infraspinatus and 5/5 subscapularis. Special testing: Negative Beatty test, negative impingement sign, negative Castillo test, speeds and Yergason negative Motor and sensory intact distally  Right Shoulder Exam: Inspection: No swelling, no ecchymosis, no deformity, no scapular winging. Palpation: No tenderness to palpation over shoulder, AC joint or trapezius. Range of motion:  Forward flexion: Active 180 degrees; Passive 180 degrees Abduction: Active 180 degrees; Passive 180 degrees External rotation (with shoulder abducted): Active 90 degrees , Passive 90 degrees Internal rotation (with shoulder abducted): Active 70 degrees, Passive 70 degrees Extension: Active 60 degrees; passive 60 degrees Adduction: Active 30 degrees; passive 30 degrees Strength: 5/5 supraspinatus, 5/5 infraspinatus and 5/5 subscapularis. Special testing: Negative Beatty test, negative impingement sign, negative Castillo test, speeds and Yergason negative Motor and sensory intact distally